# Patient Record
Sex: MALE | Race: WHITE | NOT HISPANIC OR LATINO | Employment: FULL TIME | URBAN - METROPOLITAN AREA
[De-identification: names, ages, dates, MRNs, and addresses within clinical notes are randomized per-mention and may not be internally consistent; named-entity substitution may affect disease eponyms.]

---

## 2017-08-25 ENCOUNTER — OFFICE VISIT (OUTPATIENT)
Dept: URGENT CARE | Facility: URGENT CARE | Age: 27
End: 2017-08-25
Payer: COMMERCIAL

## 2017-08-25 VITALS
RESPIRATION RATE: 16 BRPM | HEART RATE: 76 BPM | WEIGHT: 207 LBS | TEMPERATURE: 98.4 F | BODY MASS INDEX: 26.58 KG/M2 | DIASTOLIC BLOOD PRESSURE: 84 MMHG | OXYGEN SATURATION: 98 % | SYSTOLIC BLOOD PRESSURE: 124 MMHG

## 2017-08-25 DIAGNOSIS — R59.9 SWOLLEN LYMPH NODES: ICD-10-CM

## 2017-08-25 DIAGNOSIS — J02.0 STREPTOCOCCAL PHARYNGITIS: ICD-10-CM

## 2017-08-25 DIAGNOSIS — R07.0 THROAT PAIN: Primary | ICD-10-CM

## 2017-08-25 LAB
DEPRECATED S PYO AG THROAT QL EIA: ABNORMAL
HETEROPH AB SER QL: NEGATIVE
SPECIMEN SOURCE: ABNORMAL

## 2017-08-25 PROCEDURE — 87880 STREP A ASSAY W/OPTIC: CPT | Performed by: NURSE PRACTITIONER

## 2017-08-25 PROCEDURE — 99202 OFFICE O/P NEW SF 15 MIN: CPT | Performed by: NURSE PRACTITIONER

## 2017-08-25 PROCEDURE — 36415 COLL VENOUS BLD VENIPUNCTURE: CPT | Performed by: NURSE PRACTITIONER

## 2017-08-25 PROCEDURE — 86308 HETEROPHILE ANTIBODY SCREEN: CPT | Performed by: NURSE PRACTITIONER

## 2017-08-25 RX ORDER — FLUOXETINE 10 MG/1
10 TABLET, FILM COATED ORAL
COMMUNITY
Start: 2016-03-21 | End: 2024-06-27

## 2017-08-25 RX ORDER — PENICILLIN V POTASSIUM 500 MG/1
500 TABLET, FILM COATED ORAL 2 TIMES DAILY
Qty: 20 TABLET | Refills: 0 | Status: SHIPPED | OUTPATIENT
Start: 2017-08-25 | End: 2017-09-18

## 2017-08-25 NOTE — MR AVS SNAPSHOT
After Visit Summary   8/25/2017    Slick Mukherjee    MRN: [id number]           Patient Information     Date Of Birth          1990        Visit Information        Provider Department      8/25/2017 8:30 PM Jed Lopez APRN [facility]        Today's Diagnoses     Throat pain    -  1    Swollen lymph nodes        Streptococcal pharyngitis          Care Instructions      Pharyngitis: Strep (Confirmed)    You have had a positive test for strep throat. Strep throat is a contagious illness. It is spread by coughing, kissing or by touching others after touching your mouth or nose. Symptoms include throat pain that is worse with swallowing, aching all over, headache, and fever. It is treated with antibiotic medicine. This should help you start to feel better in 1 to 2 days.  Home care    Rest at home. Drink plenty of fluids to you won't get dehydrated.    No work or school for the first 2 days of taking the antibiotics. After this time, you will not be contagious. You can then return to school or work if you are feeling better.     Take antibiotic medicine for the full 10 days, even if you feel better. This is very important to ensure the infection is treated. It is also important to prevent medicine-resistant germs from developing. If you were given an antibiotic shot, you don't need any more antibiotics.    You may use acetaminophen or ibuprofen to control pain or fever, unless another medicine was prescribed for this. Talk with your doctor before taking these medicines if you have chronic liver or kidney disease. Also talk with your doctor if you have had a stomach ulcer or GI bleeding.    Throat lozenges or sprays help reduce pain. Gargling with warm saltwater will also reduce throat pain. Dissolve 1/2 teaspoon of salt in 1 glass of warm water. This may be useful just before meals.     Soft foods are OK. Avoid salty or spicy foods.  Follow-up care  Follow up with  your healthcare provider or our staff if you don't get better over the next week.  When to seek medical advice  Call your healthcare provider right away if any of these occur:    Fever of 100.4 F (38 C) or higher, or as directed by your healthcare provider    New or worsening ear pain, sinus pain, or headache    Painful lumps in the back of neck    Stiff neck    Lymph nodes getting larger or becoming soft in the middle    You can't swallow liquids or you can't open your mouth wide because of throat pain    Signs of dehydration. These include very dark urine or no urine, sunken eyes, and dizziness.    Trouble breathing or noisy breathing    Muffled voice    Rash  Date Last Reviewed: 4/13/2015 2000-2017 [employer]. [address] [place] [address], [place] [address]. All rights reserved. This information is not intended as a substitute for professional medical care. Always follow your healthcare professional's instructions.                Follow-ups after your visit        Who to contact     If you have questions or need follow up information about today's clinic visit or your schedule please contact [facility] directly at [phone].  Normal or non-critical lab and imaging results will be communicated to you by Yappsa App Storehart, letter or phone within 4 business days after the clinic has received the results. If you do not hear from us within 7 days, please contact the clinic through ePARt or phone. If you have a critical or abnormal lab result, we will notify you by phone as soon as possible.  Submit refill requests through VNG or call your pharmacy and they will forward the refill request to us. Please allow 3 business days for your refill to be completed.          Additional Information About Your Visit        VNG Information     VNG lets you send messages to your doctor, view your test results, renew your prescriptions, schedule appointments and more. To sign up, go to  [email or web address] . Click on \"Log in\" on the left side of the screen, which will take you to the Welcome page. Then click on \"Sign up Now\" on the right side of the page.     You will be asked to enter the access code listed below, as well as some personal information. Please follow the directions to create your username and password.     Your access code is: [email or web address]  Expires: 2017  9:32 PM     Your access code will  in 90 days. If you need help or a new code, please call your [place] clinic or [phone].        Care EveryWhere ID     This is your Care EveryWhere ID. This could be used by other organizations to access your [place] medical records  [id number]        Your Vitals Were     Pulse Temperature Respirations Pulse Oximetry BMI (Body Mass Index)       76 98.4  F (36.9  C) (Oral) 16 98% 26.58 kg/m2        Blood Pressure from Last 3 Encounters:   17 124/84   16 145/70   10/31/15 146/82    Weight from Last 3 Encounters:   17 207 lb (93.9 kg)              We Performed the Following     Mononucleosis screen     Strep, Rapid Screen          Today's Medication Changes          These changes are accurate as of: 17  9:32 PM.  If you have any questions, ask your nurse or doctor.               Start taking these medicines.        Dose/Directions    penicillin V potassium 500 MG tablet   Commonly known as:  VEETID   Used for:  Streptococcal pharyngitis   Started by:  Jed Lopez APRN CNP        Dose:  500 mg   Take 1 tablet (500 mg) by mouth 2 times daily   Quantity:  20 tablet   Refills:  0            Where to get your medicines      These medications [facility] to [id number] [place], [place] [facility] [address] [facility]  [address] [facility] [address]     Phone:  [phone]     penicillin V potassium 500 MG tablet                Primary Care Provider    Physician No Ref-Primary       No address on file        Equal Access to Services     [facility]" GAAR : Hadii aad jaya blaire Wagner, wawandada luqadaha, qaybta kayamel normapremga, waxvon maría hayisabella bahenaelroyjeannette ellis . So [facility] [phone].    ATENCIÓN: Si habla español, tiene a hein disposición servicios gratuitos de asistencia lingüística. Llame al [phone].    We comply with applicable federal civil rights laws and [place] laws. We do not discriminate on the basis of race, color, national origin, age, disability sex, sexual orientation or gender identity.            Thank you!     Thank you for choosing [facility]  for your care. Our goal is always to provide you with excellent care. Hearing back from our patients is one way we can continue to improve our services. Please take a few minutes to complete the written survey that you may receive in the mail after your visit with us. Thank you!             Your Updated Medication List - Protect others around you: Learn how to safely use, store and throw away your medicines at www.disposemymeds.org.          This list is accurate as of: 8/25/17  9:32 PM.  Always use your most recent med list.                   Brand Name Dispense Instructions for use Diagnosis    FLUoxetine 10 MG tablet    PROzac     Take 10 mg by mouth        penicillin V potassium 500 MG tablet    VEETID    20 tablet    Take 1 tablet (500 mg) by mouth 2 times daily    Streptococcal pharyngitis       VALIUM PO      Take 5 mg by mouth every 6 hours as needed for anxiety

## 2017-08-26 NOTE — NURSING NOTE
"Slick Mukherjee is a 27 year old male.      Chief Complaint   Patient presents with     Urgent Care     Gland     pt is here for a weird feelin go the R side of face and ear pain - has some swollen glands       Initial /84 (BP Location: Right arm, Cuff Size: Adult Large)  Pulse 76  Temp 98.4  F (36.9  C) (Oral)  Resp 16  Wt 207 lb (93.9 kg)  SpO2 98%  BMI 26.58 kg/m2 Estimated body mass index is 26.58 kg/(m^2) as calculated from the following:    Height as of 10/31/15: 6' 2\" (1.88 m).    Weight as of this encounter: 207 lb (93.9 kg).  Medication Reconciliation: complete      Questioned patient about current smoking habits.  Pt. quit smoking some time ago.      Becky Will CMA    "

## 2017-08-26 NOTE — PROGRESS NOTES
Chief Complaint   Patient presents with     Urgent Care     Gland     pt is here for a weird feelin go the R side of face and ear pain - has some swollen glands       SUBJECTIVE:  Slick Mukherjee is a 27 year old male who presents with bilateral swollen lymph nodes with otalgia. Slight ongoing fatigue. Symptoms aren't present for about 7-10 days. No relevant exposure.        OBJECTIVE:  /84 (BP Location: Right arm, Cuff Size: Adult Large)  Pulse 76  Temp 98.4  F (36.9  C) (Oral)  Resp 16  Wt 207 lb (93.9 kg)  SpO2 98%  BMI 26.58 kg/m2   young male in no acute distress. Nontoxic looking. Bilateral anterior cervical adenopathy extending to the auricular area. Bilateral eyes were non injected with pupils equal and reactive to light. Fundi was normal. Bilateral TM were clear. Bilateral external ear canals were clear. Oral mucosa was moist without any erythema or exudate.Lung sounds were clear to ascultation throughout without any wheezing or rales. No rhonchi. Heart was of regular rhythm and rate. No murmur. Abdomen was soft and nontender, with bowel sounds active throughout. No organomegaly. Skin was benign.      Results for orders placed or performed in visit on 08/25/17   Mononucleosis screen   Result Value Ref Range    Mononucleosis Screen Negative NEG^Negative   Strep, Rapid Screen   Result Value Ref Range    Specimen Description Throat     Rapid Strep A Screen (A)      POSITIVE: Group A Streptococcal antigen detected by immunoassay.           ASSESSMENT/PLAN:        (J02.0) Streptococcal pharyngitis  Comment: Strep pharyngitis treated as below. Follow-up with persisting concerns.  Plan: penicillin V potassium (VEETID) 500 MG tablet           KATHY Ceballos CNP

## 2017-08-26 NOTE — PATIENT INSTRUCTIONS
Pharyngitis: Strep (Confirmed)    You have had a positive test for strep throat. Strep throat is a contagious illness. It is spread by coughing, kissing or by touching others after touching your mouth or nose. Symptoms include throat pain that is worse with swallowing, aching all over, headache, and fever. It is treated with antibiotic medicine. This should help you start to feel better in 1 to 2 days.  Home care    Rest at home. Drink plenty of fluids to you won't get dehydrated.    No work or school for the first 2 days of taking the antibiotics. After this time, you will not be contagious. You can then return to school or work if you are feeling better.     Take antibiotic medicine for the full 10 days, even if you feel better. This is very important to ensure the infection is treated. It is also important to prevent medicine-resistant germs from developing. If you were given an antibiotic shot, you don't need any more antibiotics.    You may use acetaminophen or ibuprofen to control pain or fever, unless another medicine was prescribed for this. Talk with your doctor before taking these medicines if you have chronic liver or kidney disease. Also talk with your doctor if you have had a stomach ulcer or GI bleeding.    Throat lozenges or sprays help reduce pain. Gargling with warm saltwater will also reduce throat pain. Dissolve 1/2 teaspoon of salt in 1 glass of warm water. This may be useful just before meals.     Soft foods are OK. Avoid salty or spicy foods.  Follow-up care  Follow up with your healthcare provider or our staff if you don't get better over the next week.  When to seek medical advice  Call your healthcare provider right away if any of these occur:    Fever of 100.4 F (38 C) or higher, or as directed by your healthcare provider    New or worsening ear pain, sinus pain, or headache    Painful lumps in the back of neck    Stiff neck    Lymph nodes getting larger or becoming soft in the  middle    You can't swallow liquids or you can't open your mouth wide because of throat pain    Signs of dehydration. These include very dark urine or no urine, sunken eyes, and dizziness.    Trouble breathing or noisy breathing    Muffled voice    Rash  Date Last Reviewed: 4/13/2015 2000-2017 [employer]. [address] [place] [address], [place] [address]. All rights reserved. This information is not intended as a substitute for professional medical care. Always follow your healthcare professional's instructions.

## 2017-09-18 ENCOUNTER — OFFICE VISIT (OUTPATIENT)
Dept: URGENT CARE | Facility: URGENT CARE | Age: 27
End: 2017-09-18
Payer: COMMERCIAL

## 2017-09-18 VITALS
DIASTOLIC BLOOD PRESSURE: 100 MMHG | HEART RATE: 80 BPM | SYSTOLIC BLOOD PRESSURE: 150 MMHG | OXYGEN SATURATION: 97 % | TEMPERATURE: 98 F

## 2017-09-18 DIAGNOSIS — R07.0 THROAT PAIN: Primary | ICD-10-CM

## 2017-09-18 DIAGNOSIS — J02.0 STREP THROAT: ICD-10-CM

## 2017-09-18 LAB
DEPRECATED S PYO AG THROAT QL EIA: ABNORMAL
SPECIMEN SOURCE: ABNORMAL

## 2017-09-18 PROCEDURE — 99213 OFFICE O/P EST LOW 20 MIN: CPT | Performed by: PHYSICIAN ASSISTANT

## 2017-09-18 PROCEDURE — 87880 STREP A ASSAY W/OPTIC: CPT | Performed by: PHYSICIAN ASSISTANT

## 2017-09-18 RX ORDER — CEPHALEXIN 500 MG/1
500 CAPSULE ORAL 4 TIMES DAILY
Qty: 40 CAPSULE | Refills: 0 | Status: SHIPPED | OUTPATIENT
Start: 2017-09-18

## 2017-09-18 NOTE — NURSING NOTE
"Chief Complaint   Patient presents with     Urgent Care     Pharyngitis     Had been seen at the clinic -2 weeks ago for positive strep. Woke up with sore throat yesterday and swollen tonsil. Had been experiencing SOB only in the morning.       Initial BP (!) 150/100 (BP Location: Right arm, Patient Position: Chair, Cuff Size: Adult Regular)  Pulse 80  Temp 98  F (36.7  C) (Oral)  SpO2 97% Estimated body mass index is 26.58 kg/(m^2) as calculated from the following:    Height as of 10/31/15: 6' 2\" (1.88 m).    Weight as of 8/25/17: 207 lb (93.9 kg).  Medication Reconciliation: complete     Guillermina Saleh CMA (AAMA)          "

## 2017-09-18 NOTE — LETTER
[facility]  [address]  [facility] [address]  Phone: [phone]  Fax: [phone]    September 18, 2017        Slick Mukherjee  [address] [place] [id number]          To whom it may concern:    RE: Slick Mukherjee    Patient was seen and treated today at our clinic and missed work.  Patient may return to work 09/20/17  with the following:  No restrictions    Please contact me for questions or concerns.      Sincerely,        Carrillo Arambula PA-C

## 2017-09-18 NOTE — MR AVS SNAPSHOT
"              After Visit Summary   2017    Slick Mukherjee    MRN: [id number]           Patient Information     Date Of Birth          1990        Visit Information        Provider Department      2017 6:15 PM Carrillo Arambula PA-C [facility]        Today's Diagnoses     Throat pain    -  1    Strep throat           Follow-ups after your visit        Who to contact     If you have questions or need follow up information about today's clinic visit or your schedule please contact [facility] directly at [phone].  Normal or non-critical lab and imaging results will be communicated to you by BigDealhart, letter or phone within 4 business days after the clinic has received the results. If you do not hear from us within 7 days, please contact the clinic through SafetyCulturet or phone. If you have a critical or abnormal lab result, we will notify you by phone as soon as possible.  Submit refill requests through eCoast or call your pharmacy and they will forward the refill request to us. Please allow 3 business days for your refill to be completed.          Additional Information About Your Visit        MyChart Information     eCoast lets you send messages to your doctor, view your test results, renew your prescriptions, schedule appointments and more. To sign up, go to [email or web address] . Click on \"Log in\" on the left side of the screen, which will take you to the Welcome page. Then click on \"Sign up Now\" on the right side of the page.     You will be asked to enter the access code listed below, as well as some personal information. Please follow the directions to create your username and password.     Your access code is: [email or web address]  Expires: 2017  9:32 PM     Your access code will  in 90 days. If you need help or a new code, please call your [place] clinic or [phone].        Care EveryWhere ID     This is your Care EveryWhere ID. This could be " used by other organizations to access your [place] medical records  [id number]        Your Vitals Were     Pulse Temperature Pulse Oximetry             80 98  F (36.7  C) (Oral) 97%          Blood Pressure from Last 3 Encounters:   09/18/17 (!) 150/100   08/25/17 124/84   05/19/16 145/70    Weight from Last 3 Encounters:   08/25/17 207 lb (93.9 kg)              We Performed the Following     Rapid strep screen          Today's Medication Changes          These changes are accurate as of: 9/18/17  7:27 PM.  If you have any questions, ask your nurse or doctor.               Start taking these medicines.        Dose/Directions    cephALEXin 500 MG capsule   Commonly known as:  KEFLEX   Used for:  Strep throat   Started by:  Carrillo Arambula PA-C        Dose:  500 mg   Take 1 capsule (500 mg) by mouth 4 times daily   Quantity:  40 capsule   Refills:  0            Where to get your medicines      These medications were sent to [facility]/pharmacy #[id number] - [facility] [address]  [address], [facility] [address]     Phone:  [phone]     cephALEXin 500 MG capsule                Primary Care Provider    Physician No Ref-Primary       No address on file        Equal Access to Services     [facility] : Hadii [place] hadannao Soligia, waaxda luqadaha, qaybta kaalmada adeegyada, sarah ellis . So [facility] [phone].    ATENCIÓN: Si habla español, tiene a hein disposición servicios gratuitos de asistencia lingüística. Llame al [phone].    We comply with applicable federal civil rights laws and [place] laws. We do not discriminate on the basis of race, color, national origin, age, disability sex, sexual orientation or gender identity.            Thank you!     Thank you for choosing [facility]  for your care. Our goal is always to provide you with excellent care. Hearing back from our patients is one way we can continue to improve our services. Please take a  few minutes to complete the written survey that you may receive in the mail after your visit with us. Thank you!             Your Updated Medication List - Protect others around you: Learn how to safely use, store and throw away your medicines at www.disposemymeds.org.          This list is accurate as of: 9/18/17  7:27 PM.  Always use your most recent med list.                   Brand Name Dispense Instructions for use Diagnosis    cephALEXin 500 MG capsule    KEFLEX    40 capsule    Take 1 capsule (500 mg) by mouth 4 times daily    Strep throat       FLUoxetine 10 MG tablet    PROzac     Take 10 mg by mouth        VALIUM PO      Take 5 mg by mouth every 6 hours as needed for anxiety

## 2017-09-19 NOTE — PROGRESS NOTES
SUBJECTIVE:    Slick Mukherjee is a 27 year old male with a chief complaint of sore throat.  Onset of symptoms was 2 day(s) ago.    Course of illness: sudden onset and still present.  Severity moderate  Current and Associated symptoms: sore throat and lymphnodes are sore and swollen  Treatment measures tried include None tried.  Predisposing factors include HX of Strep. Was treated and improved on August 18th 2017    Past Medical History:   Diagnosis Date     Anxiety      Hypertension      Current Outpatient Prescriptions   Medication Sig Dispense Refill     FLUoxetine (PROZAC) 10 MG tablet Take 10 mg by mouth       Diazepam (VALIUM PO) Take 5 mg by mouth every 6 hours as needed for anxiety       Social History   Substance Use Topics     Smoking status: Former Smoker     Packs/day: 0.25     Smokeless tobacco: Former User     Alcohol use 1.2 oz/week     2 Cans of beer per week      Comment: weekly       ROS:  Review of systems negative except as stated above.    OBJECTIVE:   BP (!) 150/100 (BP Location: Right arm, Patient Position: Chair, Cuff Size: Adult Regular)  Pulse 80  Temp 98  F (36.7  C) (Oral)  SpO2 97%  GENERAL APPEARANCE: healthy, alert and no distress  EYES: EOMI,  PERRL, conjunctiva clear  HENT: ear canals and TM's normal.  Nose normal.  Pharynx erythematous with some exudate noted.  Positive tenderness and lymphadenopathy to palpation  RESP: lungs clear to auscultation - no rales, rhonchi or wheezes  CV: regular rates and rhythm, normal S1 S2, no murmur noted  ABDOMEN:  soft, nontender, no HSM or masses and bowel sounds normal  SKIN: no suspicious lesions or rashes    Rapid Strep test is positive    ASSESSMENT:   Throat pain    PLAN:   Sugessted increased rest, increased fluids and bedside humidification for comfort.  OTC tylenol and Ibuprofen for analgesia and antipyretic.  Dosed by packaging directions and weight .  Antibiotic as written below.   Noncontagious after 24 hours on the antibiotic.     Switch tooth brush after 48 hours to avoid reinfection.  Follow up with Primary Care Provider if any complications or sequelae present.    Strep throat    - cephALEXin (KEFLEX) 500 MG capsule; Take 1 capsule (500 mg) by mouth 4 times daily  Dispense: 40 capsule; Refill: 0

## 2020-02-27 ENCOUNTER — TELEPHONE (OUTPATIENT)
Dept: NURSING | Facility: CLINIC | Age: 30
End: 2020-02-27

## 2020-02-27 ENCOUNTER — HOSPITAL ENCOUNTER (EMERGENCY)
Facility: CLINIC | Age: 30
Discharge: HOME | End: 2020-02-27
Attending: EMERGENCY MEDICINE | Admitting: EMERGENCY MEDICINE
Payer: COMMERCIAL

## 2020-02-27 VITALS
OXYGEN SATURATION: 100 % | HEART RATE: 90 BPM | DIASTOLIC BLOOD PRESSURE: 108 MMHG | HEIGHT: 74 IN | WEIGHT: 200 LBS | BODY MASS INDEX: 25.67 KG/M2 | RESPIRATION RATE: 18 BRPM | SYSTOLIC BLOOD PRESSURE: 150 MMHG

## 2020-02-27 DIAGNOSIS — I10 HYPERTENSION, UNSPECIFIED TYPE: ICD-10-CM

## 2020-02-27 LAB
ANION GAP SERPL CALCULATED.3IONS-SCNC: 7 MMOL/L (ref 3–14)
BASOPHILS # BLD AUTO: 0.1 10E9/L (ref 0–0.2)
BASOPHILS NFR BLD AUTO: 0.9 %
BUN SERPL-MCNC: 13 MG/DL (ref 7–30)
CALCIUM SERPL-MCNC: 9.1 MG/DL (ref 8.5–10.1)
CHLORIDE SERPL-SCNC: 107 MMOL/L (ref 94–109)
CO2 SERPL-SCNC: 25 MMOL/L (ref 20–32)
CREAT SERPL-MCNC: 0.74 MG/DL (ref 0.66–1.25)
DIFFERENTIAL METHOD BLD: NORMAL
EOSINOPHIL # BLD AUTO: 0.4 10E9/L (ref 0–0.7)
EOSINOPHIL NFR BLD AUTO: 5.6 %
ERYTHROCYTE [DISTWIDTH] IN BLOOD BY AUTOMATED COUNT: 12.9 % (ref 10–15)
GFR SERPL CREATININE-BSD FRML MDRD: >90 ML/MIN/{1.73_M2}
GLUCOSE SERPL-MCNC: 97 MG/DL (ref 70–99)
HCT VFR BLD AUTO: 50.2 % (ref 40–53)
HGB BLD-MCNC: 16.8 G/DL (ref 13.3–17.7)
IMM GRANULOCYTES # BLD: 0 10E9/L (ref 0–0.4)
IMM GRANULOCYTES NFR BLD: 0.5 %
LYMPHOCYTES # BLD AUTO: 2.1 10E9/L (ref 0.8–5.3)
LYMPHOCYTES NFR BLD AUTO: 27.5 %
MCH RBC QN AUTO: 30.8 PG (ref 26.5–33)
MCHC RBC AUTO-ENTMCNC: 33.5 G/DL (ref 31.5–36.5)
MCV RBC AUTO: 92 FL (ref 78–100)
MONOCYTES # BLD AUTO: 0.7 10E9/L (ref 0–1.3)
MONOCYTES NFR BLD AUTO: 9.4 %
NEUTROPHILS # BLD AUTO: 4.3 10E9/L (ref 1.6–8.3)
NEUTROPHILS NFR BLD AUTO: 56.1 %
NRBC # BLD AUTO: 0 10*3/UL
NRBC BLD AUTO-RTO: 0 /100
PLATELET # BLD AUTO: 234 10E9/L (ref 150–450)
POTASSIUM SERPL-SCNC: 4 MMOL/L (ref 3.4–5.3)
RBC # BLD AUTO: 5.46 10E12/L (ref 4.4–5.9)
SODIUM SERPL-SCNC: 139 MMOL/L (ref 133–144)
TROPONIN I SERPL-MCNC: <0.015 UG/L (ref 0–0.04)
WBC # BLD AUTO: 7.7 10E9/L (ref 4–11)

## 2020-02-27 PROCEDURE — 99284 EMERGENCY DEPT VISIT MOD MDM: CPT

## 2020-02-27 PROCEDURE — 80048 BASIC METABOLIC PNL TOTAL CA: CPT | Performed by: EMERGENCY MEDICINE

## 2020-02-27 PROCEDURE — 84484 ASSAY OF TROPONIN QUANT: CPT | Performed by: EMERGENCY MEDICINE

## 2020-02-27 PROCEDURE — 85025 COMPLETE CBC W/AUTO DIFF WBC: CPT | Performed by: EMERGENCY MEDICINE

## 2020-02-27 PROCEDURE — 93005 ELECTROCARDIOGRAM TRACING: CPT

## 2020-02-27 RX ORDER — LISINOPRIL 5 MG/1
5 TABLET ORAL DAILY
Qty: 30 TABLET | Refills: 0 | Status: SHIPPED | OUTPATIENT
Start: 2020-02-27

## 2020-02-27 ASSESSMENT — ENCOUNTER SYMPTOMS
DIAPHORESIS: 1
NUMBNESS: 0
HEADACHES: 1
DIZZINESS: 1
VOMITING: 0
SHORTNESS OF BREATH: 1

## 2020-02-27 ASSESSMENT — MIFFLIN-ST. JEOR: SCORE: 1931.94

## 2020-02-27 NOTE — ED AVS SNAPSHOT
[facility] Emergency Department  [address]  [facility] [address]  Phone:  [phone]  Fax:  [phone]                                    Slick Mukherjee   MRN: [id number]    Department:  [facility] Emergency Department   Date of Visit:  2/27/2020           After Visit Summary Signature Page    I have received my discharge instructions, and my questions have been answered. I have discussed any challenges I see with this plan with the nurse or doctor.    ..........................................................................................................................................  Patient/Patient Representative Signature      ..........................................................................................................................................  Patient Representative Print Name and Relationship to Patient    ..................................................               ................................................  Date                                   Time    ..........................................................................................................................................  Reviewed by Signature/Title    ...................................................              ..............................................  Date                                               Time          22EPIC Rev 08/18

## 2020-02-27 NOTE — TELEPHONE ENCOUNTER
Pt calling stating he has a bad headache and checked his blood pressure with his home machine due the HA and got 160/100.  He states he was on lisinopril in the past due for high blood pressure but stopped after it was in control.  He has had increased stress and feels this is adding to his elevated BP.    Due to elevated BP and headache pt was advise to be evaluated in the ER  Reji Chau RN, BSN

## 2020-02-27 NOTE — DISCHARGE INSTRUCTIONS
Please have your PCP follow up with your in the next 2-3 days for repeat blood pressure check.  If you have chest pain, shortness of breath, increased swelling, difficulty doing your normal activities or other concerns return to the ED sooner.      Discharge Instructions  Hypertension - High Blood Pressure    During you visit to the Emergency Department, your blood pressure was higher than the recommended blood pressure.  This may be related to stress, pain, medication or other temporary conditions. In these cases, your blood pressure may return to normal on its own. If you have a history of high blood pressure, you may need to have your provider adjust your medications. Sometimes, your high measurement here may indicate that you have developed high blood pressure that will stay high unless it is treated. As a general rule, high blood pressure causes problems over years rather than days, weeks, or months. So, while it is important to treat blood pressure, it is rarely important to treat blood pressure immediately. Occasionally we will begin a medication in the Emergency Department; more often we will recommend close follow-up for medications with a primary doctor/clinic.    Generally, every Emergency Department visit should have a follow-up clinic visit with either a primary or a specialty clinic/provider. Please follow-up as instructed by your emergency provider today.    Return to the Emergency Department if you start to have:  A severe headache.  Chest pain.  Shortness of breath.  Weakness or numbness that affects one part of the body.  Confusion.  Vision changes.  Significant swelling of legs and/or eyes.  A reaction to any medication started in the Emergency Department.    What can I do to help myself?  Avoid alcohol.  Take any blood pressure medicine that you are prescribed.  Get a good night s sleep.  Lower your salt intake.  Exercise.  Lose weight.  Manage stress.  See your doctor regularly    If blood  pressure medication was started in the Emergency Department:  The medicine may not have an immediate effect. The body and brain determine what blood pressure you have. The medicine s job is to retrain the body s  thermostat  to a lower blood pressure.  You will need to follow up with your provider to see how this medicine is working for you.  If you were given a prescription for medicine here today, be sure to read all of the information (including the package insert) that comes with your prescription.  This will include important information about the medicine, its side effects, and any warnings that you need to know about.  The pharmacist who fills the prescription can provide more information and answer questions you may have about the medicine.  If you have questions or concerns that the pharmacist cannot address, please call or return to the Emergency Department.   Remember that you can always come back to the Emergency Department if you are not able to see your regular provider in the amount of time listed above, if you get any new symptoms, or if there is anything that worries you.

## 2020-02-27 NOTE — ED TRIAGE NOTES
Pt here with c/o dizziness and CP since yesterday. Took BP at home 160/100, called PCP, referred here. No SOB. ABC intact.

## 2020-02-27 NOTE — ED PROVIDER NOTES
History     Chief Complaint:  Hypertension and Chest Pain      HPI   Slick Mukherjee is a 29 year old male with a history of hypertension and anxiety  who presents with hypertension and chest pain. The patient says that in the past he was on lisinopril, he started at 10 mg but it made him dizzy so it was reduced to 2.5 mg. The patient says that he had to stop taking the lisinopril a few months ago due to insurance reasons. He says that after he stopped taking the lisinopril he was more cautious about his diet and exercise. He says that recently he has had more stress from work and in his personal life, including a break up with a significant other. The patient says that since yesterday he has been feeling like his blood pressure has been higher than his normal. He says that yesterday he started having some dizziness, chest pain, and shortness of breath. He says that those symptoms continued to this morning and developed a headache. He says that he measured his blood pressure at 160/100, called his doctor and was told to come to the ED.  He denies any numbness, tingling, vomiting, or history of blood clots. Of note, the patient says that he stopped drinking hard liquor a couple of weeks ago, but still drinks beer. He says that he does not think that he is going through withdrawal, but also denies ever having been in withdrawal before.     Allergies:  No Known Drug Allergies     Medications:    Adderall   Valium  Prozac     Past Medical History:    Anxiety  Hypertension   ADD     Past Surgical History:    Surgical history reviewed. No pertinent surgical history.     Family History:    Diabetes  Heart disease     Social History:  The patient was accompanied to the ED by sister.  Smoking Status: Former Smoker  Smokeless Tobacco: Former User   Alcohol Use: Positive  Drug Use: Negative  Marital Status:  Single        Review of Systems   Constitutional: Positive for diaphoresis.   Respiratory: Positive for shortness of  "breath.    Cardiovascular: Positive for chest pain.   Gastrointestinal: Negative for vomiting.   Neurological: Positive for dizziness and headaches. Negative for numbness.   All other systems reviewed and are negative.      Physical Exam     Patient Vitals for the past 24 hrs:   BP Pulse Heart Rate Resp SpO2 Height Weight   02/27/20 1600 (!) 150/108 90 90 18 -- -- --   02/27/20 1545 (!) 149/104 -- 97 11 -- -- --   02/27/20 1530 -- -- 89 10 -- -- --   02/27/20 1515 (!) 152/109 -- 96 19 -- -- --   02/27/20 1500 (!) 164/117 103 -- -- -- -- --   02/27/20 1453 (!) 163/101 -- 102 16 100 % 1.88 m (6' 2\") 90.7 kg (200 lb)        Physical Exam  General: Resting on the bed.  Head: No obvious trauma to head.  Ears, Nose, Throat:  External ears normal.  Nose normal.   Eyes:  Conjunctivae clear.  Pupils are equal, round, and reactive.   Neck: Normal range of motion.  Neck supple.   CV: Regular rate and rhythm.  No murmurs.  2+ radial pulses    Respiratory: Effort normal and breath sounds normal.  No wheezing or crackles.   Gastrointestinal: Soft.  No distension. There is no tenderness.  There is no rigidity, no rebound and no guarding.   Musculoskeletal: Non tender non edematous calves  Neuro: Alert. Moving all extremities appropriately.  Normal speech.  CN II-XII grossly intact, no pronator drift, normal finger-nose-finger, visual fields intact.  Gross muscle strength intact of the proximal and distal bilateral upper and lower extremities.  Sensation intact to light touch in all 4 extremities.  2+ patellar reflexes.  Normal gait.    Skin: Skin is warm and dry.  No rash noted.    Emergency Department Course     ECG:  ECG taken at 1506  Normal sinus rhythm  Normal ECG  Rate 98 bpm. WY interval 142 ms. QRS duration 82 ms. QT/QTc 332/432 ms. P-R-T axes 50 37 28.    Laboratory:  Laboratory findings were communicated with the patient who voiced understanding of the findings.    CBC: WBC 7.7, HGB 16.8,   BMP WNL (Creatinine " 0.74)  Troponin (Collected 1542): <0.015     Emergency Department Course:    1519 Nursing notes and vitals reviewed.    1524 I performed an exam of the patient as documented above.      IV was inserted and blood was drawn for laboratory testing, results above.      Patient rechecked and updated.      1651  Findings and plan explained to the Patient. Patient discharged home with instructions regarding supportive care, medications, and reasons to return. The importance of close follow-up was reviewed.        Impression & Plan      Medical Decision Making:  Slick Mukherjee is a 29 year old male who presents to the emergency department today for evaluation of hypertension.  Vital signs show evidence of mild hypertension but otherwise unremarkable.  Broad differential was pursued including but not limited to asymptomatic hypertension, hypertensive urgency or emergency, subarachnoid, stroke, electrolyte, metabolic, renal dysfunction, acute coronary syndrome, arrhythmia, PE, etc.  I considered PE but patient is always tachycardic due to his anxiety and I did review his chart. This is not acute today.  He has no hypoxia.  No pleuritic chest pain.  No shortness of breath.  PE seems unlikely.  Discussed this with patient and he prefers to not work-up for PE at this time.  Clear breath sounds auscultation no evidence of pneumonia, pneumothorax or effusion.  Also offered CXR but he declines.  No tearing pain, no neurologic changes, do not suspect dissection.  CBC shows no leukocytosis or anemia.  BMP shows no acute electrolyte, metabolic, renal dysfunction.  No thunderclap headache, no worst headache of life, only mildly elevated blood pressures, do not suspect subarachnoid.  Do not suspect stroke.  No indication for imaging at this time.  EKG shows sinus rhythm, no evidence acute ST-T wave change.  No evidence of arrhythmia.  No evidence of ischemia.  Troponin is negative.  At this point there is no evidence of endorgan  dysfunction.  No signs of hypertensive urgency or emergency.  His symptoms are most consistent with hypertension.  I discussed risk-benefit of resuming hypertension management at this time he would like to restart blood pressure medication.  He has previously tolerated lisinopril in the past so we opted to restart this medication.  I did  on angioedema.  Encouraged him to follow-up next week for blood pressure recheck with his primary care doctor.  Strict return precautions were discussed.  Patient was discharged home.    Diagnosis:    ICD-10-CM    1. Hypertension, unspecified type I10 Basic metabolic panel     Troponin I     Disposition:   Discharged to home     Discharge Medications:  Discharge Medication List as of 2/27/2020  4:52 PM      START taking these medications    Details   lisinopril (ZESTRIL) 5 MG tablet Take 1 tablet (5 mg) by mouth daily, Disp-30 tablet, R-0, Local Print             Scribe Disclosure:  I, Zeus Doyle, am serving as a scribe at 3:22 PM on 2/27/2020 to document services personally performed by Janna Drake MD based on my observations and the provider's statements to me.     [facility] EMERGENCY DEPARTMENT       Janna Drake MD  02/27/20 3081

## 2020-02-28 LAB — INTERPRETATION ECG - MUSE: NORMAL

## 2024-06-27 ENCOUNTER — OFFICE VISIT (OUTPATIENT)
Dept: URGENT CARE | Facility: URGENT CARE | Age: 34
End: 2024-06-27

## 2024-06-27 ENCOUNTER — ANCILLARY PROCEDURE (OUTPATIENT)
Dept: GENERAL RADIOLOGY | Facility: CLINIC | Age: 34
End: 2024-06-27
Attending: PHYSICIAN ASSISTANT

## 2024-06-27 VITALS
WEIGHT: 191 LBS | HEART RATE: 111 BPM | OXYGEN SATURATION: 97 % | TEMPERATURE: 97.8 F | DIASTOLIC BLOOD PRESSURE: 94 MMHG | SYSTOLIC BLOOD PRESSURE: 147 MMHG | BODY MASS INDEX: 24.52 KG/M2

## 2024-06-27 DIAGNOSIS — M25.562 ACUTE PAIN OF LEFT KNEE: ICD-10-CM

## 2024-06-27 DIAGNOSIS — M25.562 ACUTE PAIN OF LEFT KNEE: Primary | ICD-10-CM

## 2024-06-27 PROCEDURE — 73562 X-RAY EXAM OF KNEE 3: CPT | Mod: TC | Performed by: RADIOLOGY

## 2024-06-27 PROCEDURE — 99203 OFFICE O/P NEW LOW 30 MIN: CPT | Performed by: PHYSICIAN ASSISTANT

## 2024-06-27 RX ORDER — DEXTROAMPHETAMINE SACCHARATE, AMPHETAMINE ASPARTATE MONOHYDRATE, DEXTROAMPHETAMINE SULFATE AND AMPHETAMINE SULFATE 2.5; 2.5; 2.5; 2.5 MG/1; MG/1; MG/1; MG/1
CAPSULE, EXTENDED RELEASE ORAL
COMMUNITY
Start: 2023-09-12

## 2024-06-27 RX ORDER — CLONAZEPAM 0.5 MG/1
1 TABLET ORAL
COMMUNITY
Start: 2024-05-22

## 2024-06-27 RX ORDER — DICLOFENAC POTASSIUM 50 MG/1
50 TABLET, FILM COATED ORAL 3 TIMES DAILY
Qty: 30 TABLET | Refills: 0 | Status: SHIPPED | OUTPATIENT
Start: 2024-06-27

## 2024-06-27 NOTE — PROGRESS NOTES
Left message on machine to call back.   Rx sent to pharmacy for patient.   SUBJECTIVE:  Slick Mukherjee is a 34 year old male present with 4-day history of left knee pain.  States that he was fixing a car 4 days ago when he started to develop pain.  States that he has some throbbing pain in general but does get some sharp pain with movement.  Has been using some over-the-counter meds for symptomatic relief.  He is needing a note for work.  Denies any significant swelling.  He has no history of underlying knee issues.  There is been no redness or rash.  Walk.  Otherwise at baseline health.    Past Medical History:   Diagnosis Date    Anxiety     Hypertension      There is no problem list on file for this patient.    Current Outpatient Medications   Medication Sig Dispense Refill    amphetamine-dextroamphetamine (ADDERALL XR) 10 MG 24 hr capsule TAKE 1 CAPSULE BY MOUTH EVERY DAY IN THE MORNING UPON AWAKENING      clonazePAM (KLONOPIN) 0.5 MG tablet Take 1 tablet by mouth 3 times daily      lisinopril (ZESTRIL) 5 MG tablet Take 1 tablet (5 mg) by mouth daily 30 tablet 0    cephALEXin (KEFLEX) 500 MG capsule Take 1 capsule (500 mg) by mouth 4 times daily 40 capsule 0    Diazepam (VALIUM PO) Take 5 mg by mouth every 6 hours as needed for anxiety       No current facility-administered medications for this visit.     Social History     Socioeconomic History    Marital status: Single     Spouse name: Not on file    Number of children: Not on file    Years of education: Not on file    Highest education level: Not on file   Occupational History    Not on file   Tobacco Use    Smoking status: Former     Current packs/day: 0.25     Types: Cigarettes    Smokeless tobacco: Former   Substance and Sexual Activity    Alcohol use: Yes     Alcohol/week: 2.0 standard drinks of alcohol     Types: 2 Cans of beer per week     Comment: weekly    Drug use: No     Types: Marijuana     Comment:      Sexual activity: Not on file   Other Topics Concern    Not on file   Social History Narrative    Not on file      Social Determinants of Health     Financial Resource Strain: High Risk (1/1/2022)    Received from [facility]    Financial Resource Strain     Difficulty of Paying Living Expenses: Not on file     Difficulty of Paying Living Expenses: Not on file   Food Insecurity: Not on file   Transportation Needs: Not on file   Physical Activity: Not on file   Stress: Not on file   Social Connections: Unknown (6/27/2023)    Received from [facility]    Social Connections     Frequency of Communication with Friends and Family: Not on file   Interpersonal Safety: Not on file   Housing Stability: Not on file     ROS  negative other than stated above    Exam:  GENERAL APPEARANCE: healthy, alert and no distress  EYES: EOMI,  PERRL  MS: Very mild soft swelling noted around the patella.  There is no gross deformity.  He does have full range of motion.  There is no overlying erythema.  No laxity in the joint.  Mild generalized tenderness.  SKIN: no suspicious lesions or rashes  NEURO: Normal strength and tone, sensory exam grossly normal, mentation intact and speech normal    Results for orders placed or performed in visit on 06/27/24   XR Knee Left 3 Views     Status: None    Narrative    Examination:  XR KNEE LEFT 3 VIEWS    Date:  6/27/2024 12:22 PM     Clinical Information: Acute pain of left knee    Comparison: none.      Impression    Impression:    1.  Question mild patellar soft tissue swelling. Otherwise normal left  knee. No fracture. Minimal increased joint fluid. Normal joint  alignment and spacing.    TIANA GUAJARDO MD         SYSTEM ID:  [id number]         assessment/plan:  (M25.562) Acute pain of left knee  (primary encounter diagnosis)  Comment:   Plan: XR Knee Left 3 Views, diclofenac (CATAFLAM) 50         MG tablet        Patient with a 4-day history of left knee pain after she injured it fixing a car.  X-ray with small soft tissue swelling  otherwise no abnormality noted.  There is no laxity.  Advised to ice and use wrap for comfort measure.  Does not want a knee sleeve.  Will give diclofenac for comfort measures.  Note for work.  Red flag signs were discussed.  Will follow-up with Ortho if symptoms fail to improve as anticipated